# Patient Record
Sex: MALE | URBAN - METROPOLITAN AREA
[De-identification: names, ages, dates, MRNs, and addresses within clinical notes are randomized per-mention and may not be internally consistent; named-entity substitution may affect disease eponyms.]

---

## 2021-04-08 ENCOUNTER — EMERGENCY (EMERGENCY)
Facility: HOSPITAL | Age: 31
LOS: 1 days | Discharge: HOME | End: 2021-04-08
Attending: EMERGENCY MEDICINE | Admitting: EMERGENCY MEDICINE
Payer: COMMERCIAL

## 2021-04-08 VITALS
WEIGHT: 315 LBS | TEMPERATURE: 98 F | DIASTOLIC BLOOD PRESSURE: 83 MMHG | HEIGHT: 60 IN | HEART RATE: 85 BPM | RESPIRATION RATE: 14 BRPM | SYSTOLIC BLOOD PRESSURE: 150 MMHG | OXYGEN SATURATION: 100 %

## 2021-04-08 PROCEDURE — 99283 EMERGENCY DEPT VISIT LOW MDM: CPT

## 2021-04-08 PROCEDURE — 73610 X-RAY EXAM OF ANKLE: CPT | Mod: 26,LT

## 2021-04-08 PROCEDURE — 73610 X-RAY EXAM OF ANKLE: CPT

## 2021-04-08 PROCEDURE — 99283 EMERGENCY DEPT VISIT LOW MDM: CPT | Mod: 25

## 2021-04-08 PROCEDURE — 29515 APPLICATION SHORT LEG SPLINT: CPT | Mod: LT

## 2021-04-08 RX ORDER — IBUPROFEN 200 MG
600 TABLET ORAL ONCE
Refills: 0 | Status: COMPLETED | OUTPATIENT
Start: 2021-04-08 | End: 2021-04-08

## 2021-04-08 RX ADMIN — Medication 600 MILLIGRAM(S): at 21:30

## 2021-04-08 NOTE — ED PROVIDER NOTE - PATIENT PORTAL LINK FT
You can access the FollowMyHealth Patient Portal offered by [facility] by registering at the following website: [email or web address]. By joining [employer]’s FollowMyHealth portal, you will also be able to view your health information using other applications (apps) compatible with our system.

## 2021-04-08 NOTE — ED PROVIDER NOTE - CLINICAL SUMMARY MEDICAL DECISION MAKING FREE TEXT BOX
Ankle inversion Injury- xray r/o fx, pain control, air cast, crutches, PMD/Ortho follow up Ankle inversion Injury- xray r/o fx, pain control, air cast, crutches, PMD/Ortho follow up  **update: xray no acute fracture

## 2021-04-08 NOTE — ED PROVIDER NOTE - ATTENDING CONTRIBUTION TO CARE
I have personally seen and examined this patient. I have fully participated in the care of this patient. I have reviewed all pertinent clinical information, including history physical exam, plan and the PA's note and agree except as noted  31 y/o m no pmh pw left ankle pain s/p inversion injury into a trench at work today just PTA. Took no meds. Denies head trauma, back pain, other MSK injury.  PE : left ankle no gross deformity,  mod edema, decreased ROM due to edema , minimal tenderness on lat malleolus, distal NV intact

## 2021-04-08 NOTE — ED PROVIDER NOTE - OBJECTIVE STATEMENT
31 y/o m no pmh pw left ankle pain s/p inversion injury into a trench at work today just PTA. Took no meds. Denies head trauma, back pain, other MSK injury.

## 2021-04-08 NOTE — ED ADULT NURSE NOTE - OBJECTIVE STATEMENT
Patient alert and oriented X4, c/o left ankle pain after "twisting" it at work. Patient denies LOC, HA, neck/back pain, trauma, numbness/tingling, recent falls. Patient denies taking medications PTA.

## 2021-04-08 NOTE — ED PROVIDER NOTE - WR ORDER ID 1
Problem: Nutrition/Hydration-ADULT  Goal: Nutrient/Hydration intake appropriate for improving, restoring or maintaining nutritional needs  Description  Monitor and assess patient's nutrition/hydration status for malnutrition (ex- brittle hair, bruises, dry skin, pale skin and conjunctiva, muscle wasting, smooth red tongue, and disorientation)  Collaborate with interdisciplinary team and initiate plan and interventions as ordered  Monitor patient's weight and dietary intake as ordered or per policy  Utilize nutrition screening tool and intervene per policy  Determine patient's food preferences and provide high-protein, high-caloric foods as appropriate       INTERVENTIONS:  - Monitor oral intake, urinary output, labs, and treatment plans  - Assess nutrition and hydration status and recommend course of action  - Evaluate amount of meals eaten  - Assist patient with eating if necessary   - Allow adequate time for meals  - Recommend/ encourage appropriate diets, oral nutritional supplements, and vitamin/mineral supplements  - Order, calculate, and assess calorie counts as needed  - Provide specific nutrition/hydration education as appropriate  - Include patient/family/caregiver in decisions related to nutrition  Outcome: Progressing [id number]

## 2021-04-08 NOTE — ED PROVIDER NOTE - NSFOLLOWUPINSTRUCTIONS_ED_ALL_ED_FT
Follow up with your PMD within 48-72 hours.  Rest and elevate your ankle.    Apply ice 20 minutes on 3x/day.   Keep aircast on during the day for support for the next week. Ambulate as tolerated using cane assistance.   Take Motrin 600mg every 6-8hrs for pain with food.    Worsening, continued or ANY new concerning symptoms return to the emergency department.   For further Orthopedic evaluation you can call: Find a Physician helpline ([phone]) for assistance    Ankle Sprain     An ankle sprain is a stretch or tear in a ligament in the ankle. Ligaments are tissues that connect bones to each other.  The two most common types of ankle sprains are:  Inversion sprain. This happens when the foot turns inward and the ankle rolls outward. It affects the ligament on the outside of the foot (lateral ligament).Eversion sprain. This happens when the foot turns outward and the ankle rolls inward. It affects the ligament on the inner side of the foot (medial ligament).What are the causes?  This condition is often caused by accidentally rolling or twisting the ankle.  What increases the risk?  You are more likely to develop this condition if you play sports.  What are the signs or symptoms?  Symptoms of this condition include:  Pain in your ankle.Swelling.Bruising. This may develop right after you sprain your ankle or 1–2 days later.Trouble standing or walking, especially when you turn or change directions.How is this diagnosed?  This condition is diagnosed with:  A physical exam. During the exam, your health care provider will press on certain parts of your foot and ankle and try to move them in certain ways.X-ray imaging. These may be taken to see how severe the sprain is and to check for broken bones.How is this treated?  This condition may be treated with:  A brace or splint. This is used to keep the ankle from moving until it heals.An elastic bandage. This is used to support the ankle.Crutches.Pain medicine.Surgery. This may be needed if the sprain is severe.Physical therapy. This may help to improve the range of motion in the ankle.Follow these instructions at home:  If you have a brace or a splint:     Wear the brace or splint as told by your health care provider. Remove it only as told by your health care provider.Loosen the brace or splint if your toes tingle, become numb, or turn cold and blue.Keep the brace or splint clean.If the brace or splint is not waterproof:  Do not let it get wet. Cover it with a watertight covering when you take a bath or a shower.If you have an elastic bandage (dressing):     Remove it to shower or bathe.Try not to move your ankle much, but wiggle your toes from time to time. This helps to prevent swelling.Adjust the dressing to make it more comfortable if it feels too tight.Loosen the dressing if you have numbness or tingling in your foot, or if your foot becomes cold and blue.Managing pain, stiffness, and swelling        Take over-the-counter and prescription medicines only as told by your health care provider.For 2–3 days, keep your ankle raised (elevated) above the level of your heart as much as possible.If directed, put ice on the injured area:  If you have a removable brace or splint, remove it as told by your health care provider.Put ice in a plastic bag.Place a towel between your skin and the bag.Leave the ice on for 20 minutes, 2–3 times a day.General instructions     Rest your ankle.Do not use the injured limb to support your body weight until your health care provider says that you can. Use crutches as told by your health care provider.Do not use any products that contain nicotine or tobacco, such as cigarettes, e-cigarettes, and chewing tobacco. If you need help quitting, ask your health care provider.Keep all follow-up visits as told by your health care provider. This is important.Contact a health care provider if:  You have rapidly increasing bruising or swelling.Your pain is not relieved with medicine.Get help right away if:  Your foot or toes become numb or blue.You have severe pain that gets worse.Summary  An ankle sprain is a stretch or tear in a ligament in the ankle. Ligaments are tissues that connect bones to each other.This condition is often caused by accidentally rolling or twisting the ankle.Symptoms include pain, swelling, bruising, and trouble walking.To relieve pain and swelling, put ice on the affected ankle, raise your ankle above the level of your heart, and use an elastic bandage.Keep all follow-up visits as told by your health care provider. This is important.

## 2022-08-08 NOTE — ED PROVIDER NOTE - CPE EDP RESP NORM
normal...
Assistance with ambulation/Assistance OOB with selected safe patient handling equipment/Communicate Risk of Fall with Harm to all staff/Discuss with provider need for PT consult/Monitor gait and stability/Provide patient with walking aids - walker, cane, crutches/Reinforce activity limits and safety measures with patient and family/Tailored Fall Risk Interventions/Visual Cue: Yellow wristband and red socks/Bed in lowest position, wheels locked, appropriate side rails in place/Call bell, personal items and telephone in reach/Instruct patient to call for assistance before getting out of bed or chair/Non-slip footwear when patient is out of bed/[place] to call system/Physically safe environment - no spills, clutter or unnecessary equipment/Purposeful Proactive Rounding/Room/bathroom lighting operational, light cord in reach